# Patient Record
Sex: FEMALE | Race: WHITE | Employment: FULL TIME | URBAN - METROPOLITAN AREA
[De-identification: names, ages, dates, MRNs, and addresses within clinical notes are randomized per-mention and may not be internally consistent; named-entity substitution may affect disease eponyms.]

---

## 2019-01-11 ENCOUNTER — HOSPITAL ENCOUNTER (OUTPATIENT)
Dept: MAMMOGRAPHY | Age: 44
Discharge: HOME | End: 2019-01-11
Attending: PHYSICIAN ASSISTANT
Payer: COMMERCIAL

## 2019-01-11 DIAGNOSIS — R92.8 ABNORMAL MAMMOGRAM: ICD-10-CM

## 2019-01-11 DIAGNOSIS — R92.8 ABNORMAL MAMMOGRAM OF RIGHT BREAST: ICD-10-CM

## 2019-01-11 DIAGNOSIS — N63.0 BREAST LUMP: ICD-10-CM

## 2019-01-11 PROCEDURE — 77062 BREAST TOMOSYNTHESIS BI: CPT | Performed by: PHYSICIAN ASSISTANT

## 2019-01-11 PROCEDURE — 77066 DX MAMMO INCL CAD BI: CPT | Performed by: PHYSICIAN ASSISTANT

## 2022-11-02 ENCOUNTER — TELEPHONE (OUTPATIENT)
Dept: SCHEDULING | Age: 47
End: 2022-11-02

## 2022-11-02 ENCOUNTER — WALK IN (OUTPATIENT)
Dept: URGENT CARE | Age: 47
End: 2022-11-02

## 2022-11-02 VITALS
RESPIRATION RATE: 18 BRPM | WEIGHT: 186.07 LBS | HEIGHT: 64 IN | SYSTOLIC BLOOD PRESSURE: 122 MMHG | TEMPERATURE: 98.2 F | BODY MASS INDEX: 31.77 KG/M2 | HEART RATE: 84 BPM | DIASTOLIC BLOOD PRESSURE: 74 MMHG | OXYGEN SATURATION: 98 %

## 2022-11-02 DIAGNOSIS — J01.10 ACUTE NON-RECURRENT FRONTAL SINUSITIS: Primary | ICD-10-CM

## 2022-11-02 DIAGNOSIS — J40 BRONCHITIS: ICD-10-CM

## 2022-11-02 PROCEDURE — 99213 OFFICE O/P EST LOW 20 MIN: CPT | Performed by: NURSE PRACTITIONER

## 2022-11-02 RX ORDER — LEVOTHYROXINE SODIUM 0.15 MG/1
150 TABLET ORAL
COMMUNITY

## 2022-11-02 RX ORDER — ERGOCALCIFEROL 1.25 MG/1
1.25 CAPSULE ORAL
COMMUNITY
Start: 2022-10-17

## 2022-11-02 RX ORDER — PREDNISONE 20 MG/1
20 TABLET ORAL DAILY
Qty: 1 TABLET | Refills: 0 | Status: SHIPPED | OUTPATIENT
Start: 2022-11-02 | End: 2022-11-05

## 2022-11-02 RX ORDER — ESCITALOPRAM OXALATE 10 MG/1
10 TABLET ORAL DAILY
COMMUNITY

## 2022-11-02 RX ORDER — DEXTROMETHORPHAN HYDROBROMIDE AND PROMETHAZINE HYDROCHLORIDE 15; 6.25 MG/5ML; MG/5ML
5 SYRUP ORAL 4 TIMES DAILY PRN
Qty: 118 ML | Refills: 0 | Status: SHIPPED | OUTPATIENT
Start: 2022-11-02 | End: 2022-11-09

## 2022-11-02 RX ORDER — THIAMINE HCL 100 MG
TABLET ORAL
COMMUNITY
Start: 2022-10-17

## 2022-11-02 RX ORDER — ALBUTEROL SULFATE 90 UG/1
2 AEROSOL, METERED RESPIRATORY (INHALATION) EVERY 4 HOURS PRN
Qty: 1 EACH | Refills: 0 | Status: SHIPPED | OUTPATIENT
Start: 2022-11-02

## 2022-11-02 RX ORDER — AZITHROMYCIN 250 MG/1
TABLET, FILM COATED ORAL
Qty: 6 TABLET | Refills: 0 | Status: SHIPPED | OUTPATIENT
Start: 2022-11-02

## 2022-11-02 ASSESSMENT — ENCOUNTER SYMPTOMS
COUGH: 1
GASTROINTESTINAL NEGATIVE: 1
SHORTNESS OF BREATH: 1
CHILLS: 1
HEADACHES: 1
SPEECH DIFFICULTY: 0
NUMBNESS: 0
FATIGUE: 1
EYES NEGATIVE: 1
TREMORS: 0
WHEEZING: 1
LIGHT-HEADEDNESS: 0
WEAKNESS: 0

## 2024-11-25 ENCOUNTER — TELEPHONE (OUTPATIENT)
Dept: HEMATOLOGY/ONCOLOGY | Facility: HOSPITAL | Age: 49
End: 2024-11-25

## 2024-11-25 NOTE — TELEPHONE ENCOUNTER
Called [facility], on patient's behalf, and requested patient's images to the [facility]. They asked for request via fax to [phone]. Faxed request.

## 2024-11-25 NOTE — TELEPHONE ENCOUNTER
Called patient's PCP, via request, to ask for RB ultrasound-guided biopsy order from Dr. Joyce. They stated that they will fax order to provided central scheduling number [phone] or will have Dr. Joyce place order via Epic. Awaiting order and will check.

## 2024-11-25 NOTE — TELEPHONE ENCOUNTER
Received phone call from call center from patient. Introduced myself as one of the breast RN navigators. Patient stated that she had recent breast images at [facility] and was recommended for a breast biopsy. She stated RB ultrasound-guided biopsy 2 o'clock 9 cm from the nipple. We reviewed her breast images she has had and it was 2010 with a breast biopsy and 2019 was mammogram and the rest she had recently at [facility]. Patient verbalized consent for me to contact [facility] and her PCP, Dr. Joyce, about obtaining breast images and biopsy order on her behalf. We reviewed her BIRADS-5 score and answered her questions to the best of my ability. Will update patient with image request and images.

## 2024-11-26 ENCOUNTER — TELEPHONE (OUTPATIENT)
Dept: HEMATOLOGY/ONCOLOGY | Facility: HOSPITAL | Age: 49
End: 2024-11-26

## 2024-11-26 NOTE — TELEPHONE ENCOUNTER
Contacted patient to update her on plan of care. I stated that I contacted the [facility] breast schedulers and they confirmed they have received the breast biopsy order from patient's provider, Dr. Joyce. I stated to her that I contacted [facility] today to confirm they received the request for patient's breast images and they stated they had and images should arrive by tomorrow at [place]. I stated once the images are reviewed, the breast schedulers will contact her to schedule her biopsy. She thanked me for the phone call and update.

## 2024-11-27 ENCOUNTER — NURSE NAVIGATOR ENCOUNTER (OUTPATIENT)
Dept: HEMATOLOGY/ONCOLOGY | Facility: HOSPITAL | Age: 49
End: 2024-11-27

## 2024-11-27 ENCOUNTER — TELEPHONE (OUTPATIENT)
Dept: HEMATOLOGY/ONCOLOGY | Facility: HOSPITAL | Age: 49
End: 2024-11-27

## 2024-11-27 NOTE — PROGRESS NOTES
Received patient's breast images from November 20, 2024 on a disc. Brought to the radiology department. Order for breast biopsy rec'd from patient's Dr. Joyce.

## 2024-11-27 NOTE — TELEPHONE ENCOUNTER
Was notified by [facility] that the only image on a disc from [facility] was a right breast diagnostic mammogram.  She recently also had a screening mammogram and ultrasound. Called [facility] and requested they send all images on a disc.  They will send via [facility] today.  Once all images are received will schedule patient.

## 2024-12-02 ENCOUNTER — TELEPHONE (OUTPATIENT)
Dept: HEMATOLOGY/ONCOLOGY | Facility: HOSPITAL | Age: 49
End: 2024-12-02

## 2024-12-02 ENCOUNTER — TELEPHONE (OUTPATIENT)
Dept: MAMMOGRAPHY | Facility: HOSPITAL | Age: 49
End: 2024-12-02

## 2024-12-02 NOTE — TELEPHONE ENCOUNTER
Outside images ([facility])(BIRADS 5) have been received and reviewed by Dr Shaffer and approved to schedule a right breast 1 site ultrasound guided biopsy.  This Breast Care RN phoned pt re right breast 1 site ultrasound guided biopsy recommendation outside images for mass. Procedure reviewed and all questions answered. Reviewed educational handouts. On the day of the biopsy, pt instructed to take Tylenol 1000mg PO, eat a light meal & bring or wear a sports bra.  Post biopsy care also reviewed with pt to include NO lifting more than 5lbs, no exercising or housework (limit upper body movement) for 24-48 hrs post biopsy. Patient denies blood thinners, bleeding disorders, liver disease, and chemo.Pt verbalized understanding.  Our breast center schedulers will be calling to schedule an appt that is convenient for pt.

## 2024-12-02 NOTE — TELEPHONE ENCOUNTER
Patient called inquiring about when her biopsy will be scheduled and I stated to her that we requested more of her images and they arrived and brought to the radiology department. I stated I would contact the breast scheduling department. We discussed the type of biopsy she was recommended for and I answered her questions to the best of my ability. I stated that her next steps is to await for the schedulers to contact her to schedule her biopsy. She thanked me for the assistance. Contact information provided to patient for further questions.

## 2024-12-09 ENCOUNTER — HOSPITAL ENCOUNTER (OUTPATIENT)
Dept: MAMMOGRAPHY | Facility: HOSPITAL | Age: 49
Discharge: HOME | End: 2024-12-09
Attending: FAMILY MEDICINE
Payer: COMMERCIAL

## 2024-12-09 DIAGNOSIS — R92.8 ABNORMAL MAMMOGRAM: ICD-10-CM

## 2024-12-09 DIAGNOSIS — R92.8 ABNORMAL ULTRASOUND OF BREAST: ICD-10-CM

## 2024-12-09 DIAGNOSIS — N63.0 BREAST MASS: ICD-10-CM

## 2024-12-09 PROCEDURE — 88305 TISSUE EXAM BY PATHOLOGIST: CPT | Performed by: FAMILY MEDICINE

## 2024-12-09 PROCEDURE — 77065 DX MAMMO INCL CAD UNI: CPT | Performed by: FAMILY MEDICINE

## 2024-12-09 PROCEDURE — 88344 IMHCHEM/IMCYTCHM EA MLT ANTB: CPT | Performed by: FAMILY MEDICINE

## 2024-12-09 PROCEDURE — 19083 BX BREAST 1ST LESION US IMAG: CPT | Performed by: FAMILY MEDICINE

## 2024-12-09 NOTE — DISCHARGE INSTRUCTIONS
Discharge Instructions  Stereotactic / Ultrasound / MRI Core Biopsy     Place an ice pack on top of the biopsy site in your bra OR the folds of the Ace wrap for 10-15 minutes every hour until bedtime for your comfort and to decrease bleeding.     Keep your supportive bra OR the Ace wrap in place for 24 hours after your biopsy. This compression decreases bleeding and breast movement for your comfort. Wear a supportive bra for the next couple days for comfort (as well as for sleeping)     No bath or shower during the first 24 hours after biopsy. After this time, you may take a bath or shower. It’s okay if the steri-strips get wet but don’t soak them.   No saunas, hot tubs, or swimming pools until the steri-strips fall off (5-7days).  This prevents infection and allows time for the area to completely close and heal.     DO NOT remove the steri-strips. They will fall off in 5 days to two weeks. If any type of irritation (redness, itching, OR blisters) develops in the area around the steri-strips, remove them gently. Keep the area clean and dry.     It is normal to have mild discomfort and bruising at the biopsy site.      You may take Tylenol as needed for discomfort.     DO NOT participate in strenuous activity (aerobics, heavy lifting, housework, gardening, etc.) 48 hours after your biopsy to prevent bleeding.     You will receive results typically within 2-3 business days. Occasionally, the specimen is sent off-site for a 2nd opinion. You will be notified when this occurs as this will delay your results.     If you have any questions about the procedure or your results, please contact the Breast Care Coordinator Nurse at [phone]. (M-F 7:30-4)    If you are having a MRI Breast Biopsy or an Ultrasound guided biopsy, you will be billed for the biopsy and a unilateral mammogram separately.    A 6-month or one year follow-up Diagnostic Mammogram/Ultrasound will be recommended to document stability of the biopsy  site. It may be scheduled at [facility] or [facility] by calling [phone]. You will need an order for this exam from your referring physician.       5/2024

## 2024-12-11 NOTE — IMAGING NOTE
1316: Spoke with Chelsea Tim post ultrasound guided right breast biopsy.  Introduced myself as one of the breast nurse coordinators at [facility] and informed Ms. Tim of the purpose of my call.  Name and date of birth verified by patient.    Chelsea Tim confirmed awareness of breast pathology results-MyChart notification.    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  -See comment.   See EMR for complete pathology report    [place] verified by Dr. Shaffer.  Recommendation pending. Chelsea Tim instructed to follow up with her referring provider Dr. Joyce for radiologist imaging recommendation.     Chelsea Tim verbalized understanding and agreement to the above.    Reinforced post biopsy care and instruction.  Chelsea Tim denies any issues with biopsy site- bleeding, drainage, redness, tenderness.     Ms. Tim instructed to perform breast self-exams and notify physician of any changes/concerns. Chelsea Tim verbalized agreement.

## 2024-12-12 ENCOUNTER — TELEPHONE (OUTPATIENT)
Dept: GENERAL RADIOLOGY | Facility: HOSPITAL | Age: 49
End: 2024-12-12

## 2024-12-12 NOTE — TELEPHONE ENCOUNTER
1328: Spoke briefly with Chelsea Tim at this time-follow up to previous conversation.  Informed Ms. Hodgek that Dr. Yifan Read recommending one year follow up breast imaging.  See addendum to 12-09-24 breast biopsy report in epic  Chelsea Tim verbalized understanding and gratitude for the call.